# Patient Record
Sex: MALE | Race: WHITE | NOT HISPANIC OR LATINO | URBAN - METROPOLITAN AREA
[De-identification: names, ages, dates, MRNs, and addresses within clinical notes are randomized per-mention and may not be internally consistent; named-entity substitution may affect disease eponyms.]

---

## 2021-12-14 ENCOUNTER — OFFICE (AMBULATORY)
Dept: URBAN - METROPOLITAN AREA PATHOLOGY 4 | Facility: PATHOLOGY | Age: 71
End: 2021-12-14
Payer: COMMERCIAL

## 2021-12-14 ENCOUNTER — ON CAMPUS - OUTPATIENT (AMBULATORY)
Dept: URBAN - METROPOLITAN AREA HOSPITAL 2 | Facility: HOSPITAL | Age: 71
End: 2021-12-14
Payer: COMMERCIAL

## 2021-12-14 VITALS
OXYGEN SATURATION: 98 % | HEIGHT: 72 IN | SYSTOLIC BLOOD PRESSURE: 163 MMHG | DIASTOLIC BLOOD PRESSURE: 99 MMHG | DIASTOLIC BLOOD PRESSURE: 107 MMHG | SYSTOLIC BLOOD PRESSURE: 176 MMHG | SYSTOLIC BLOOD PRESSURE: 165 MMHG | HEART RATE: 88 BPM | SYSTOLIC BLOOD PRESSURE: 190 MMHG | DIASTOLIC BLOOD PRESSURE: 105 MMHG | TEMPERATURE: 97.3 F | OXYGEN SATURATION: 96 % | HEART RATE: 87 BPM | SYSTOLIC BLOOD PRESSURE: 182 MMHG | SYSTOLIC BLOOD PRESSURE: 157 MMHG | WEIGHT: 315 LBS | SYSTOLIC BLOOD PRESSURE: 189 MMHG | HEART RATE: 83 BPM | RESPIRATION RATE: 17 BRPM | RESPIRATION RATE: 19 BRPM | OXYGEN SATURATION: 99 % | OXYGEN SATURATION: 93 % | SYSTOLIC BLOOD PRESSURE: 161 MMHG | HEART RATE: 85 BPM | HEART RATE: 80 BPM | SYSTOLIC BLOOD PRESSURE: 170 MMHG | RESPIRATION RATE: 14 BRPM | HEART RATE: 89 BPM | DIASTOLIC BLOOD PRESSURE: 77 MMHG | DIASTOLIC BLOOD PRESSURE: 102 MMHG | RESPIRATION RATE: 16 BRPM | OXYGEN SATURATION: 95 % | DIASTOLIC BLOOD PRESSURE: 89 MMHG | DIASTOLIC BLOOD PRESSURE: 78 MMHG

## 2021-12-14 DIAGNOSIS — D12.2 BENIGN NEOPLASM OF ASCENDING COLON: ICD-10-CM

## 2021-12-14 DIAGNOSIS — K57.30 DIVERTICULOSIS OF LARGE INTESTINE WITHOUT PERFORATION OR ABS: ICD-10-CM

## 2021-12-14 DIAGNOSIS — Z86.010 PERSONAL HISTORY OF COLONIC POLYPS: ICD-10-CM

## 2021-12-14 PROBLEM — K63.5 POLYP OF COLON: Status: ACTIVE | Noted: 2021-12-14

## 2021-12-14 LAB
GI HISTOLOGY: A. UNSPECIFIED: (no result)
GI HISTOLOGY: PDF REPORT: (no result)

## 2021-12-14 PROCEDURE — 45385 COLONOSCOPY W/LESION REMOVAL: CPT | Mod: PT | Performed by: INTERNAL MEDICINE

## 2021-12-14 PROCEDURE — 88305 TISSUE EXAM BY PATHOLOGIST: CPT | Mod: 26 | Performed by: INTERNAL MEDICINE

## 2021-12-14 RX ADMIN — HYOSCYAMINE SULFATE: 0.12 TABLET ORAL at 11:57

## 2023-07-31 ENCOUNTER — OFFICE VISIT (OUTPATIENT)
Dept: NEUROSURGERY | Facility: CLINIC | Age: 73
End: 2023-07-31
Payer: MEDICARE

## 2023-07-31 VITALS
BODY MASS INDEX: 42.66 KG/M2 | HEART RATE: 96 BPM | WEIGHT: 315 LBS | SYSTOLIC BLOOD PRESSURE: 134 MMHG | DIASTOLIC BLOOD PRESSURE: 80 MMHG | HEIGHT: 72 IN

## 2023-07-31 DIAGNOSIS — M53.3 SACROILIAC JOINT DYSFUNCTION: Primary | ICD-10-CM

## 2023-07-31 PROCEDURE — 99204 OFFICE O/P NEW MOD 45 MIN: CPT | Performed by: NURSE PRACTITIONER

## 2023-07-31 RX ORDER — MILK THISTLE 150 MG
CAPSULE ORAL
COMMUNITY

## 2023-07-31 RX ORDER — CYCLOBENZAPRINE HCL 10 MG
10 TABLET ORAL 3 TIMES DAILY PRN
COMMUNITY

## 2023-07-31 RX ORDER — DICYCLOMINE HCL 20 MG
20 TABLET ORAL EVERY 6 HOURS
COMMUNITY

## 2023-07-31 RX ORDER — GABAPENTIN 300 MG/1
300 CAPSULE ORAL 3 TIMES DAILY
COMMUNITY

## 2023-07-31 RX ORDER — LISINOPRIL 10 MG/1
10 TABLET ORAL DAILY
COMMUNITY

## 2023-07-31 RX ORDER — CLOTRIMAZOLE 1 %
1 CREAM (GRAM) TOPICAL 2 TIMES DAILY
COMMUNITY

## 2023-07-31 RX ORDER — POTASSIUM CHLORIDE 20 MEQ/1
20 TABLET, EXTENDED RELEASE ORAL 2 TIMES DAILY
COMMUNITY

## 2023-07-31 RX ORDER — ASPIRIN 81 MG/1
81 TABLET, CHEWABLE ORAL DAILY
COMMUNITY

## 2023-07-31 RX ORDER — MELOXICAM 7.5 MG/1
15 TABLET ORAL DAILY
COMMUNITY

## 2023-07-31 RX ORDER — METFORMIN HYDROCHLORIDE 500 MG/1
500 TABLET, EXTENDED RELEASE ORAL
COMMUNITY

## 2023-07-31 RX ORDER — BUPROPION HYDROCHLORIDE 150 MG/1
TABLET ORAL
COMMUNITY
Start: 2023-02-20

## 2023-07-31 RX ORDER — CLONAZEPAM 1 MG/1
1 TABLET ORAL 2 TIMES DAILY PRN
COMMUNITY

## 2023-07-31 RX ORDER — FUROSEMIDE 40 MG/1
40 TABLET ORAL 2 TIMES DAILY
COMMUNITY

## 2023-07-31 RX ORDER — HYDROCODONE BITARTRATE AND ACETAMINOPHEN 7.5; 325 MG/1; MG/1
1 TABLET ORAL EVERY 6 HOURS PRN
COMMUNITY

## 2023-07-31 RX ORDER — CHLORAL HYDRATE 500 MG
CAPSULE ORAL
COMMUNITY

## 2023-07-31 RX ORDER — ATENOLOL 25 MG/1
25 TABLET ORAL DAILY
COMMUNITY

## 2023-07-31 RX ORDER — ATORVASTATIN CALCIUM 20 MG/1
TABLET, FILM COATED ORAL
COMMUNITY
Start: 2023-03-27

## 2023-07-31 RX ORDER — VITAMIN B COMPLEX
CAPSULE ORAL DAILY
COMMUNITY

## 2023-08-03 ENCOUNTER — PATIENT ROUNDING (BHMG ONLY) (OUTPATIENT)
Dept: NEUROSURGERY | Facility: CLINIC | Age: 73
End: 2023-08-03
Payer: COMMERCIAL

## 2023-08-08 ENCOUNTER — HOSPITAL ENCOUNTER (OUTPATIENT)
Dept: GENERAL RADIOLOGY | Facility: HOSPITAL | Age: 73
Discharge: HOME | End: 2023-08-08
Admitting: NURSE PRACTITIONER
Payer: MEDICARE

## 2023-08-08 DIAGNOSIS — M53.3 SACROILIAC JOINT DYSFUNCTION: ICD-10-CM

## 2023-08-08 PROCEDURE — 72082 X-RAY EXAM ENTIRE SPI 2/3 VW: CPT

## 2023-08-10 ENCOUNTER — TELEPHONE (OUTPATIENT)
Dept: NEUROSURGERY | Facility: CLINIC | Age: 73
End: 2023-08-10
Payer: COMMERCIAL

## 2023-08-10 NOTE — TELEPHONE ENCOUNTER
CALLED PATIENT AND LM THAT JUAN CARLOS WOULD LIKE PATIENT TO FOLLOW UP WITH DR CLARKE FOR MRI FINDINGS AND POSSIBLE SURGERY. HUB OK TO MAKE APT FOR PATIENT WHEN HE CALLS BACK

## 2023-08-11 NOTE — TELEPHONE ENCOUNTER
TATE FLOWE PATIENTS WIFE CALLED AND STATES THAT THE PATIENT WAS SUPPOSED TO HAVE AN MRI AT [facility].  STATES SHE HAS CALLED TO SCHEDULE AND THEY STATE THEY DO NOT HAVE AN ORDER.  TATE WOULD LIKE TO KNOW IF SHE CAN COME BY AND  THE REFERRAL TO [facility].    PLEASE CALL TATE @ PHONE NUMBER [phone]

## 2023-08-14 NOTE — TELEPHONE ENCOUNTER
Called and LVM to call the office. We faxed the MRI to [facility] last week on 8/10/2023. I have re-faxed it again today. HUB ok to relay the message.

## 2023-08-14 NOTE — TELEPHONE ENCOUNTER
PATIENT'S WIFE CALLED AGAIN TO CHECK ON THIS SITUATION. SHE STATES THAT IT IS OK TO HAVE MRI DONE AT [facility] IF THIS WILL BE SIMPLER. PLEASE RETURN HER CALL, AS THEY ARE NEEDING TO SCHEDULE MRI PRIOR TO SCHEDULING APPT WITH DR CLARKE.

## 2023-08-14 NOTE — TELEPHONE ENCOUNTER
PATIENT'S WIFE CALLED BACK IN - SHARED LAST COMM PER NEO - SHE VOICED UNDERSTANDING AND WILL CALL [facility]

## 2023-08-16 NOTE — TELEPHONE ENCOUNTER
PT WIFE CALLED AGAIN, STATES SHE JUST TALKED TO [facility] AND THEY STILL DO NOT HAVE THE ORDER FOR MRI. PT WIFE STATES [facility] WAS TO CONTACT OUR OFFICE TODAY TO ADVISE.     PLEASE REFAX AND GIVE WIFE AN UPDATE    THANK YOU,

## 2023-08-25 DIAGNOSIS — M53.3 SACROILIAC JOINT DYSFUNCTION: ICD-10-CM

## 2023-08-28 NOTE — PROGRESS NOTES
"Subjective   History of Present Illness: Nik Moreno is a 73 y.o. male is here today for follow-up with a new Lumbar MRI and X-rays. Today patient reports continued low back pain.  Patient describes pain that is centered at his low back along the belt line.  Patient denies any pain in his buttocks.  No pain radiating down his legs.  He has undergone multiple epidural steroid injections without improvement.  He is also undergone extensive physical therapy without improvement    Chief Complaint   Patient presents with    Back Pain     Follow up           Previous treatment: Physical Therapy/[facility] 12-15 visits    Previous neurosurgery:     Previous injections: SI joint injections,    The following portions of the patient's history were reviewed and updated as appropriate: allergies, current medications, past family history, past medical history, past social history, past surgical history, and problem list.    Review of Systems   Constitutional:  Positive for activity change.   HENT: Negative.     Eyes: Negative.    Respiratory: Negative.     Cardiovascular: Negative.    Gastrointestinal: Negative.    Endocrine: Negative.    Genitourinary: Negative.    Musculoskeletal:  Positive for arthralgias, back pain and myalgias.   Skin: Negative.    Allergic/Immunologic: Negative.    Neurological: Negative.    Hematological: Negative.    Psychiatric/Behavioral:  Positive for sleep disturbance.      Objective      /70   Pulse 80   Ht 182.9 cm (72\")   Wt (!) 156 kg (345 lb)   BMI 46.79 kg/mý    Body mass index is 46.79 kg/mý.  Vitals:    08/30/23 1422   PainSc:   5           Neurologic Exam     Mental Status   Oriented to person, place, and time.     Motor Exam     Strength   Strength 5/5 throughout.     Gait, Coordination, and Reflexes     Reflexes   Right Barboza: absent  Left Barboza: absent  Right ankle clonus: absent  Left ankle clonus: absent    Assessment & Plan   Independent Review of Radiographic Studies:    "   I personally reviewed and interpreted the images from the following studies.    Mild to moderate degenerative changes most severe at L4-5.  There is no high-grade central or foraminal stenosis    Scoliosis x-ray: Overall balanced    Medical Decision Making:      Nik Moreno is a 73 y.o. male with a long history of progressively worsening low back pain.  There are degenerative changes on MRI but these are mild to moderate.  Patient has no radicular or neurogenic claudication symptoms and has no evidence of significant nerve compression on MRI.  No surgical intervention is indicated.  Patient can follow-up with his pain management team and continue with therapy.      Diagnoses and all orders for this visit:    1. Lumbar degenerative disc disease (Primary)      No follow-ups on file.    This patient was examined wearing appropriate personal protective equipment.                      Dr. Christophe Cabrales IV    08/30/23  14:49 EDT

## 2023-08-30 ENCOUNTER — OFFICE VISIT (OUTPATIENT)
Dept: NEUROSURGERY | Facility: CLINIC | Age: 73
End: 2023-08-30
Payer: MEDICARE

## 2023-08-30 VITALS
SYSTOLIC BLOOD PRESSURE: 167 MMHG | DIASTOLIC BLOOD PRESSURE: 70 MMHG | BODY MASS INDEX: 42.66 KG/M2 | WEIGHT: 315 LBS | HEART RATE: 80 BPM | HEIGHT: 72 IN

## 2023-08-30 DIAGNOSIS — M51.36 LUMBAR DEGENERATIVE DISC DISEASE: Primary | ICD-10-CM
